# Patient Record
Sex: FEMALE | Race: WHITE | NOT HISPANIC OR LATINO
[De-identification: names, ages, dates, MRNs, and addresses within clinical notes are randomized per-mention and may not be internally consistent; named-entity substitution may affect disease eponyms.]

---

## 2017-01-09 PROBLEM — M70.72 HIP BURSITIS, LEFT: Status: ACTIVE | Noted: 2017-01-09

## 2017-01-09 PROBLEM — S82.025D CLOSED NONDISPLACED LONGITUDINAL FRACTURE OF LEFT PATELLA WITH ROUTINE HEALING, SUBSEQUENT ENCOUNTER: Status: ACTIVE | Noted: 2017-01-09

## 2018-04-26 PROBLEM — N87.9 CERVICAL DYSPLASIA: Status: ACTIVE | Noted: 2018-04-26

## 2018-04-26 PROBLEM — N81.11 CYSTOCELE, MIDLINE: Status: ACTIVE | Noted: 2018-04-26

## 2018-04-26 PROBLEM — N90.89 VULVAR LESION: Status: ACTIVE | Noted: 2018-04-26

## 2018-04-26 PROBLEM — N81.6 RECTOCELE: Status: ACTIVE | Noted: 2018-04-26

## 2018-04-26 PROBLEM — N81.4 UTERINE PROLAPSE: Status: ACTIVE | Noted: 2018-04-26

## 2018-04-26 PROCEDURE — 88305 TISSUE EXAM BY PATHOLOGIST: CPT | Performed by: OBSTETRICS & GYNECOLOGY

## 2018-06-22 ENCOUNTER — HOSPITAL (OUTPATIENT)
Dept: OTHER | Age: 68
End: 2018-06-22
Attending: INTERNAL MEDICINE

## 2018-06-22 ENCOUNTER — DIAGNOSTIC TRANS (OUTPATIENT)
Dept: OTHER | Age: 68
End: 2018-06-22

## 2018-10-26 ENCOUNTER — CHARTING TRANS (OUTPATIENT)
Dept: OTHER | Age: 68
End: 2018-10-26

## 2019-10-15 ENCOUNTER — APPOINTMENT (OUTPATIENT)
Dept: URGENT CARE | Age: 69
End: 2019-10-15

## 2019-10-21 ENCOUNTER — TELEPHONE (OUTPATIENT)
Dept: URGENT CARE | Age: 69
End: 2019-10-21

## 2023-01-19 ENCOUNTER — TELEPHONE (OUTPATIENT)
Dept: OTHER | Age: 73
End: 2023-01-19

## 2023-10-12 ENCOUNTER — APPOINTMENT (OUTPATIENT)
Dept: GENERAL RADIOLOGY | Age: 73
End: 2023-10-12
Payer: MEDICARE

## 2023-10-12 ENCOUNTER — APPOINTMENT (OUTPATIENT)
Dept: ULTRASOUND IMAGING | Age: 73
End: 2023-10-12
Attending: EMERGENCY MEDICINE
Payer: MEDICARE

## 2023-10-12 ENCOUNTER — HOSPITAL ENCOUNTER (EMERGENCY)
Age: 73
Discharge: HOME | End: 2023-10-12
Attending: EMERGENCY MEDICINE
Payer: MEDICARE

## 2023-10-12 VITALS
DIASTOLIC BLOOD PRESSURE: 80 MMHG | WEIGHT: 233 LBS | RESPIRATION RATE: 18 BRPM | TEMPERATURE: 98 F | HEART RATE: 84 BPM | BODY MASS INDEX: 43.99 KG/M2 | OXYGEN SATURATION: 98 % | SYSTOLIC BLOOD PRESSURE: 112 MMHG | HEIGHT: 61 IN

## 2023-10-12 DIAGNOSIS — S82.001A CLOSED NONDISPLACED FRACTURE OF RIGHT PATELLA, UNSPECIFIED FRACTURE MORPHOLOGY, INITIAL ENCOUNTER: Primary | ICD-10-CM

## 2023-10-12 PROCEDURE — 99284 EMERGENCY DEPT VISIT MOD MDM: CPT

## 2023-10-12 PROCEDURE — 73502 X-RAY EXAM HIP UNI 2-3 VIEWS: CPT

## 2023-10-12 PROCEDURE — 73562 X-RAY EXAM OF KNEE 3: CPT

## 2023-10-12 PROCEDURE — 93971 EXTREMITY STUDY: CPT | Performed by: EMERGENCY MEDICINE

## 2023-10-12 RX ORDER — ESCITALOPRAM OXALATE 10 MG/1
10 TABLET ORAL DAILY
COMMUNITY

## 2023-10-12 RX ORDER — PANTOPRAZOLE SODIUM 20 MG/1
20 TABLET, DELAYED RELEASE ORAL
COMMUNITY

## 2023-10-12 RX ORDER — ACETAMINOPHEN 500 MG
1000 TABLET ORAL ONCE
Status: COMPLETED | OUTPATIENT
Start: 2023-10-12 | End: 2023-10-12

## 2023-10-12 NOTE — ED INITIAL ASSESSMENT (HPI)
Pt fell a week ago hit the top of her head no loc left hip pain r knee and r calf pain.  Pt concerned she may have a blood clot also having left groin pain

## 2023-10-13 ENCOUNTER — TELEPHONE (OUTPATIENT)
Dept: CASE MANAGEMENT | Facility: HOSPITAL | Age: 73
End: 2023-10-13

## 2023-10-13 NOTE — CM/SW NOTE
2258:  Called by Dr. Nelli Hamilton requesting ERCM to assist with getting patient wheelchair due to pt's right patella fracture. ERCM had Dr. Nelli Hamilton confirm pt's contact# [phone] on face sheet is correct. ERCM also informed Dr. Nelli Hamilton to inform patient [facility] will fax wheelchair order to [employer] and patient should receive a call from [address] [place] [address] regarding wheelchair. Dr. Nelli Hamilton v/u.    10/13/23 @ 7636: [facility] order form completed and faxed to E with ER Encounter Summary and Face sheet - confirmation rec'd. ERC will inform daytime [facility] ERCM to F/U on the above. ERC also called Evan Boss in [facility] Registration and informed her this [facility] is faxing all of the above (HME order/ER Encounter Summary and Face sheet) to the [facility] ER Registration Fax @ [phone] and to please scan into pt's chart. Evan Boss v/u.

## 2023-10-13 NOTE — CM/SW NOTE
Unable to secure a wheelchair through [facility] until the correct verbage is added to the MD note. Cost for wheelchair through [facility] is $28 today and then $5/month. Mitzy from [address] [place] [address] states patient has [employer] and may be able to get it covered through them. Called patient with this information. Patient states a wheelchair will not fit through her doorway so is unable to use it. Family is assisting patient and has acquired rolling walker, crutches and other equipment to assist patient. Patient states she has all the support/assistance she needs at this time. [facility] notified that order for walker is cancelled.